# Patient Record
Sex: FEMALE | Race: BLACK OR AFRICAN AMERICAN | NOT HISPANIC OR LATINO | URBAN - METROPOLITAN AREA
[De-identification: names, ages, dates, MRNs, and addresses within clinical notes are randomized per-mention and may not be internally consistent; named-entity substitution may affect disease eponyms.]

---

## 2024-03-22 ENCOUNTER — INPATIENT (INPATIENT)
Facility: HOSPITAL | Age: 34
LOS: 2 days | Discharge: HOME | End: 2024-03-25
Attending: OBSTETRICS & GYNECOLOGY | Admitting: OBSTETRICS & GYNECOLOGY
Payer: COMMERCIAL

## 2024-03-22 VITALS
HEIGHT: 65 IN | DIASTOLIC BLOOD PRESSURE: 78 MMHG | WEIGHT: 207.01 LBS | SYSTOLIC BLOOD PRESSURE: 122 MMHG | RESPIRATION RATE: 18 BRPM | HEART RATE: 83 BPM | TEMPERATURE: 98 F

## 2024-03-22 DIAGNOSIS — Z98.891 HISTORY OF UTERINE SCAR FROM PREVIOUS SURGERY: Chronic | ICD-10-CM

## 2024-03-22 DIAGNOSIS — Z28.09 IMMUNIZATION NOT CARRIED OUT BECAUSE OF OTHER CONTRAINDICATION: ICD-10-CM

## 2024-03-22 DIAGNOSIS — Z28.21 IMMUNIZATION NOT CARRIED OUT BECAUSE OF PATIENT REFUSAL: ICD-10-CM

## 2024-03-22 LAB
AMPHET UR-MCNC: NEGATIVE — SIGNIFICANT CHANGE UP
APPEARANCE UR: ABNORMAL
BACTERIA # UR AUTO: ABNORMAL /HPF
BARBITURATES UR SCN-MCNC: NEGATIVE — SIGNIFICANT CHANGE UP
BASOPHILS # BLD AUTO: 0.02 K/UL — SIGNIFICANT CHANGE UP (ref 0–0.2)
BASOPHILS NFR BLD AUTO: 0.3 % — SIGNIFICANT CHANGE UP (ref 0–1)
BENZODIAZ UR-MCNC: NEGATIVE — SIGNIFICANT CHANGE UP
BILIRUB UR-MCNC: NEGATIVE — SIGNIFICANT CHANGE UP
BLD GP AB SCN SERPL QL: SIGNIFICANT CHANGE UP
BUPRENORPHINE SCREEN, URINE RESULT: NEGATIVE — SIGNIFICANT CHANGE UP
CAST: 2 /LPF — SIGNIFICANT CHANGE UP (ref 0–4)
COCAINE METAB.OTHER UR-MCNC: NEGATIVE — SIGNIFICANT CHANGE UP
COLOR SPEC: YELLOW — SIGNIFICANT CHANGE UP
DIFF PNL FLD: NEGATIVE — SIGNIFICANT CHANGE UP
EOSINOPHIL # BLD AUTO: 0.02 K/UL — SIGNIFICANT CHANGE UP (ref 0–0.7)
EOSINOPHIL NFR BLD AUTO: 0.3 % — SIGNIFICANT CHANGE UP (ref 0–8)
FENTANYL UR QL: NEGATIVE — SIGNIFICANT CHANGE UP
GLUCOSE UR QL: NEGATIVE MG/DL — SIGNIFICANT CHANGE UP
HCT VFR BLD CALC: 37.9 % — SIGNIFICANT CHANGE UP (ref 37–47)
HGB BLD-MCNC: 12.1 G/DL — SIGNIFICANT CHANGE UP (ref 12–16)
HIV 1 & 2 AB SERPL IA.RAPID: SIGNIFICANT CHANGE UP
IMM GRANULOCYTES NFR BLD AUTO: 1.8 % — HIGH (ref 0.1–0.3)
KETONES UR-MCNC: NEGATIVE MG/DL — SIGNIFICANT CHANGE UP
L&D DRUG SCREEN, URINE: SIGNIFICANT CHANGE UP
LEUKOCYTE ESTERASE UR-ACNC: ABNORMAL
LYMPHOCYTES # BLD AUTO: 0.91 K/UL — LOW (ref 1.2–3.4)
LYMPHOCYTES # BLD AUTO: 12.5 % — LOW (ref 20.5–51.1)
MCHC RBC-ENTMCNC: 26.7 PG — LOW (ref 27–31)
MCHC RBC-ENTMCNC: 31.9 G/DL — LOW (ref 32–37)
MCV RBC AUTO: 83.5 FL — SIGNIFICANT CHANGE UP (ref 81–99)
METHADONE UR-MCNC: NEGATIVE — SIGNIFICANT CHANGE UP
MONOCYTES # BLD AUTO: 0.43 K/UL — SIGNIFICANT CHANGE UP (ref 0.1–0.6)
MONOCYTES NFR BLD AUTO: 5.9 % — SIGNIFICANT CHANGE UP (ref 1.7–9.3)
NEUTROPHILS # BLD AUTO: 5.77 K/UL — SIGNIFICANT CHANGE UP (ref 1.4–6.5)
NEUTROPHILS NFR BLD AUTO: 79.2 % — HIGH (ref 42.2–75.2)
NITRITE UR-MCNC: NEGATIVE — SIGNIFICANT CHANGE UP
NRBC # BLD: 0 /100 WBCS — SIGNIFICANT CHANGE UP (ref 0–0)
OPIATES UR-MCNC: NEGATIVE — SIGNIFICANT CHANGE UP
OXYCODONE UR-MCNC: NEGATIVE — SIGNIFICANT CHANGE UP
PCP UR-MCNC: NEGATIVE — SIGNIFICANT CHANGE UP
PH UR: 6.5 — SIGNIFICANT CHANGE UP (ref 5–8)
PLATELET # BLD AUTO: 225 K/UL — SIGNIFICANT CHANGE UP (ref 130–400)
PMV BLD: 9.9 FL — SIGNIFICANT CHANGE UP (ref 7.4–10.4)
PROPOXYPHENE QUALITATIVE URINE RESULT: NEGATIVE — SIGNIFICANT CHANGE UP
PROT UR-MCNC: SIGNIFICANT CHANGE UP MG/DL
RBC # BLD: 4.54 M/UL — SIGNIFICANT CHANGE UP (ref 4.2–5.4)
RBC # FLD: 14.3 % — SIGNIFICANT CHANGE UP (ref 11.5–14.5)
RBC CASTS # UR COMP ASSIST: 2 /HPF — SIGNIFICANT CHANGE UP (ref 0–4)
SP GR SPEC: 1.03 — SIGNIFICANT CHANGE UP (ref 1–1.03)
SQUAMOUS # UR AUTO: 10 /HPF — HIGH (ref 0–5)
UROBILINOGEN FLD QL: 1 MG/DL — SIGNIFICANT CHANGE UP (ref 0.2–1)
WBC # BLD: 7.28 K/UL — SIGNIFICANT CHANGE UP (ref 4.8–10.8)
WBC # FLD AUTO: 7.28 K/UL — SIGNIFICANT CHANGE UP (ref 4.8–10.8)
WBC UR QL: 11 /HPF — HIGH (ref 0–5)

## 2024-03-22 PROCEDURE — 80307 DRUG TEST PRSMV CHEM ANLYZR: CPT

## 2024-03-22 PROCEDURE — 86703 HIV-1/HIV-2 1 RESULT ANTBDY: CPT

## 2024-03-22 PROCEDURE — 80354 DRUG SCREENING FENTANYL: CPT

## 2024-03-22 PROCEDURE — 59025 FETAL NON-STRESS TEST: CPT

## 2024-03-22 PROCEDURE — 86780 TREPONEMA PALLIDUM: CPT

## 2024-03-22 PROCEDURE — 86901 BLOOD TYPING SEROLOGIC RH(D): CPT

## 2024-03-22 PROCEDURE — 81001 URINALYSIS AUTO W/SCOPE: CPT

## 2024-03-22 PROCEDURE — 85025 COMPLETE CBC W/AUTO DIFF WBC: CPT

## 2024-03-22 PROCEDURE — 86850 RBC ANTIBODY SCREEN: CPT

## 2024-03-22 PROCEDURE — 36415 COLL VENOUS BLD VENIPUNCTURE: CPT

## 2024-03-22 PROCEDURE — 86900 BLOOD TYPING SEROLOGIC ABO: CPT

## 2024-03-22 RX ORDER — SODIUM CHLORIDE 9 MG/ML
1000 INJECTION, SOLUTION INTRAVENOUS ONCE
Refills: 0 | Status: DISCONTINUED | OUTPATIENT
Start: 2024-03-22 | End: 2024-03-22

## 2024-03-22 RX ORDER — OXYTOCIN 10 UNIT/ML
333.33 VIAL (ML) INJECTION
Qty: 20 | Refills: 0 | Status: DISCONTINUED | OUTPATIENT
Start: 2024-03-22 | End: 2024-03-25

## 2024-03-22 RX ORDER — ENOXAPARIN SODIUM 100 MG/ML
40 INJECTION SUBCUTANEOUS EVERY 24 HOURS
Refills: 0 | Status: DISCONTINUED | OUTPATIENT
Start: 2024-03-22 | End: 2024-03-25

## 2024-03-22 RX ORDER — MAGNESIUM HYDROXIDE 400 MG/1
30 TABLET, CHEWABLE ORAL
Refills: 0 | Status: DISCONTINUED | OUTPATIENT
Start: 2024-03-22 | End: 2024-03-25

## 2024-03-22 RX ORDER — ONDANSETRON 8 MG/1
4 TABLET, FILM COATED ORAL EVERY 6 HOURS
Refills: 0 | Status: DISCONTINUED | OUTPATIENT
Start: 2024-03-22 | End: 2024-03-22

## 2024-03-22 RX ORDER — OXYCODONE HYDROCHLORIDE 5 MG/1
5 TABLET ORAL ONCE
Refills: 0 | Status: DISCONTINUED | OUTPATIENT
Start: 2024-03-22 | End: 2024-03-25

## 2024-03-22 RX ORDER — ACETAMINOPHEN 500 MG
975 TABLET ORAL
Refills: 0 | Status: DISCONTINUED | OUTPATIENT
Start: 2024-03-22 | End: 2024-03-25

## 2024-03-22 RX ORDER — OXYTOCIN 10 UNIT/ML
333.33 VIAL (ML) INJECTION
Qty: 20 | Refills: 0 | Status: DISCONTINUED | OUTPATIENT
Start: 2024-03-22 | End: 2024-03-22

## 2024-03-22 RX ORDER — IBUPROFEN 200 MG
600 TABLET ORAL EVERY 6 HOURS
Refills: 0 | Status: COMPLETED | OUTPATIENT
Start: 2024-03-22 | End: 2025-02-18

## 2024-03-22 RX ORDER — SIMETHICONE 80 MG/1
80 TABLET, CHEWABLE ORAL EVERY 4 HOURS
Refills: 0 | Status: DISCONTINUED | OUTPATIENT
Start: 2024-03-22 | End: 2024-03-25

## 2024-03-22 RX ORDER — LANOLIN
1 OINTMENT (GRAM) TOPICAL EVERY 6 HOURS
Refills: 0 | Status: DISCONTINUED | OUTPATIENT
Start: 2024-03-22 | End: 2024-03-25

## 2024-03-22 RX ORDER — SODIUM CHLORIDE 9 MG/ML
1000 INJECTION, SOLUTION INTRAVENOUS
Refills: 0 | Status: DISCONTINUED | OUTPATIENT
Start: 2024-03-22 | End: 2024-03-22

## 2024-03-22 RX ORDER — CEFAZOLIN SODIUM 1 G
2000 VIAL (EA) INJECTION ONCE
Refills: 0 | Status: COMPLETED | OUTPATIENT
Start: 2024-03-22 | End: 2024-03-22

## 2024-03-22 RX ORDER — DIPHENHYDRAMINE HCL 50 MG
25 CAPSULE ORAL EVERY 6 HOURS
Refills: 0 | Status: DISCONTINUED | OUTPATIENT
Start: 2024-03-22 | End: 2024-03-25

## 2024-03-22 RX ORDER — SODIUM CHLORIDE 9 MG/ML
1000 INJECTION, SOLUTION INTRAVENOUS
Refills: 0 | Status: DISCONTINUED | OUTPATIENT
Start: 2024-03-22 | End: 2024-03-25

## 2024-03-22 RX ORDER — LEVOTHYROXINE SODIUM 125 MCG
50 TABLET ORAL DAILY
Refills: 0 | Status: DISCONTINUED | OUTPATIENT
Start: 2024-03-22 | End: 2024-03-25

## 2024-03-22 RX ORDER — OXYCODONE HYDROCHLORIDE 5 MG/1
5 TABLET ORAL
Refills: 0 | Status: DISCONTINUED | OUTPATIENT
Start: 2024-03-22 | End: 2024-03-25

## 2024-03-22 RX ORDER — NALOXONE HYDROCHLORIDE 4 MG/.1ML
0.1 SPRAY NASAL
Refills: 0 | Status: DISCONTINUED | OUTPATIENT
Start: 2024-03-22 | End: 2024-03-22

## 2024-03-22 RX ORDER — KETOROLAC TROMETHAMINE 30 MG/ML
30 SYRINGE (ML) INJECTION EVERY 6 HOURS
Refills: 0 | Status: DISCONTINUED | OUTPATIENT
Start: 2024-03-22 | End: 2024-03-23

## 2024-03-22 RX ORDER — MORPHINE SULFATE 50 MG/1
0.15 CAPSULE, EXTENDED RELEASE ORAL ONCE
Refills: 0 | Status: DISCONTINUED | OUTPATIENT
Start: 2024-03-22 | End: 2024-03-22

## 2024-03-22 RX ORDER — DEXAMETHASONE 0.5 MG/5ML
4 ELIXIR ORAL EVERY 6 HOURS
Refills: 0 | Status: DISCONTINUED | OUTPATIENT
Start: 2024-03-22 | End: 2024-03-22

## 2024-03-22 RX ORDER — TETANUS TOXOID, REDUCED DIPHTHERIA TOXOID AND ACELLULAR PERTUSSIS VACCINE, ADSORBED 5; 2.5; 8; 8; 2.5 [IU]/.5ML; [IU]/.5ML; UG/.5ML; UG/.5ML; UG/.5ML
0.5 SUSPENSION INTRAMUSCULAR ONCE
Refills: 0 | Status: DISCONTINUED | OUTPATIENT
Start: 2024-03-22 | End: 2024-03-25

## 2024-03-22 RX ADMIN — Medication 30 MILLIGRAM(S): at 14:18

## 2024-03-22 RX ADMIN — Medication 30 MILLIGRAM(S): at 22:27

## 2024-03-22 RX ADMIN — Medication 30 MILLIGRAM(S): at 21:57

## 2024-03-22 RX ADMIN — ENOXAPARIN SODIUM 40 MILLIGRAM(S): 100 INJECTION SUBCUTANEOUS at 21:57

## 2024-03-22 RX ADMIN — Medication 975 MILLIGRAM(S): at 17:15

## 2024-03-22 RX ADMIN — Medication 30 MILLIGRAM(S): at 14:45

## 2024-03-22 RX ADMIN — Medication 975 MILLIGRAM(S): at 17:45

## 2024-03-22 RX ADMIN — Medication 100 MILLIGRAM(S): at 08:47

## 2024-03-22 NOTE — OB PROVIDER H&P - NSICDXPASTSURGICALHX_GEN_ALL_CORE_FT
PAST SURGICAL HISTORY:  No significant past surgical history PAST SURGICAL HISTORY:  Previous  section

## 2024-03-22 NOTE — OB PROVIDER DELIVERY SUMMARY - NS_GESTAGEATBIRTHA_OBGYN_ALL_OB_FT
41w6d Protopic Pregnancy And Lactation Text: This medication is Pregnancy Category C. It is unknown if this medication is excreted in breast milk when applied topically.

## 2024-03-22 NOTE — OB PROVIDER DELIVERY SUMMARY - NSCSDELIVASCHE_OBGYN_ALL_OB
Pt here for iron infusion.   Arrives Ambulating independently, accompanied by Other self           Modifications in dose or schedule: No     Frequency of blood return and site check throughout administration: Prior to administration   Discharged to Home, Am Scheduled

## 2024-03-22 NOTE — OB RN INTRAOPERATIVE NOTE - NSSELHIDDEN_OBGYN_ALL_OB_FT
[NS_DeliveryAttending1_OBGYN_ALL_OB_FT:[id number]],[NS_DeliveryAssist1_OBGYN_ALL_OB_FT:[id number]=],[NS_DeliveryRN_OBGYN_ALL_OB_FT:[id number]=]

## 2024-03-22 NOTE — OB PROVIDER H&P - NSHPLABSRESULTS_GEN_ALL_CORE
12/15 GCT 87  10/2 AFP neg, NIPT neg. AFP neg  8/15 PN Labs: A POS  RPR nr  Measles, rubella, VZV immune  HBSag neg  HIV neg    2/16/24  GBS neg    22w3d vtx, posterior placenta, MVP 5.1 cm, incomplete views of LVOT, 3VV  32w4d  breech, posterior, 14.7 cm ROHITH, BPP 8/8, EFW 1884 gm 37%  36w6d vtx, posterior, 14.4 cm ROHITH, BPP 8/8, nl UAD EFW 2721 gms 30%

## 2024-03-22 NOTE — BRIEF OPERATIVE NOTE - OPERATION/FINDINGS
Pfannenstiel skin incision. Low transverse uterine incision. Clear amniotic fluid. Female  in cephalic position, APGARs 9/9. Hysterotomy closed in two layers. Normal tubes and ovaries bilaterally. 5cm posterior submucosal fibroid.

## 2024-03-22 NOTE — OB PROVIDER H&P - NSHPPHYSICALEXAM_GEN_ALL_CORE
Gen: Nad  Abd:  VE:deferred  FHR:  TOCO: Vital Signs (24 Hrs):  T(C): 36.5 (03-22-24 @ 06:55), Max: 36.5 (03-22-24 @ 06:55)  HR: 83 (03-22-24 @ 07:15) (83 - 83)  BP: 122/78 (03-22-24 @ 07:15) (122/78 - 122/78)  RR: 18 (03-22-24 @ 07:15) (18 - 18)    Gen: Nad  Abd: gravid, soft NT  VE:deferred  FHR:140/mod/+accels  TOCO: none

## 2024-03-22 NOTE — PROGRESS NOTE ADULT - ASSESSMENT
A/P: 33yo now P2 S/P repeat C/S #2 POD 0, QBL 3580cc, recovering well   - pain management with PO pain meds   - monitor vitals/bleeding   - encourage incentive spirometry   - ambulation/PO hydration  - advance diet as tolerated   - simethicone  - synthroid 50mcg ordered  - SCDs/lovenox for DVT prophylaxis   - UO adequate, goldstein until AM  - f/u AM labs   - routine postop care    A/P: 35yo now P2 S/P repeat C/S #2 POD 0, QBL 350cc, recovering well   - pain management with PO pain meds   - monitor vitals/bleeding   - encourage incentive spirometry   - ambulation/PO hydration  - advance diet as tolerated   - simethicone  - synthroid 50mcg ordered  - SCDs/lovenox for DVT prophylaxis   - UO adequate, goldstein until AM  - f/u AM labs   - routine postop care

## 2024-03-22 NOTE — OB PROVIDER H&P - NS_OBGYNHISTORY_OBGYN_ALL_OB_FT
primary c/s #1- 12/9/2006 NRFHR   Denies abn paps, fibroids, cysts or STIs primary c/s #1-6 lbs 9 oz 12/9/2006 NRFHR   Denies abn paps, fibroids, cysts or STIs

## 2024-03-22 NOTE — OB RN DELIVERY SUMMARY - NSSELHIDDEN_OBGYN_ALL_OB_FT
[NS_DeliveryAttending1_OBGYN_ALL_OB_FT:[id number]=],[NS_DeliveryAssist1_OBGYN_ALL_OB_FT:[id number]=],[NS_DeliveryRN_OBGYN_ALL_OB_FT:[id number]=]

## 2024-03-22 NOTE — CHART NOTE - NSCHARTNOTEFT_GEN_A_CORE
PACU ANESTHESIA ADMISSION NOTE      Procedure: Repeat  section      Post op diagnosis:  Post term pregnancy, 41 weeks        ____  Intubated  TV:______       Rate: ______      FiO2: ______    _x___  Patent Airway    _x___  Full return of protective reflexes    _x___  Full recovery from anesthesia / back to baseline status    Vitals:  T(C): 36.4  HR: 66  BP: 102/58  RR: 18  SpO2: 96%    Mental Status:  _x___ Awake   _____ Alert   _____ Drowsy   _____ Sedated    Nausea/Vomiting:  _x___  NO       ______Yes,   See Post - Op Orders         Pain Scale (0-10):  __0___    Treatment: _x___ None    ____ See Post - Op/PCA Orders    Post - Operative Fluids:   __x__ Oral   ____ See Post - Op Orders    Plan: Discharge:   ____Home       _x____Floor     _____Critical Care    _____  Other:_________________    Comments:  No anesthesia issues or complications noted.  Discharge when criteria met.

## 2024-03-22 NOTE — OB PROVIDER H&P - ASSESSMENT
34 yr old  at 40w6d, AMA, RH POS/GBS neg for repeat c/s#2.    Admit to L&D  IV Hydration and labs  Continuous TOCO and EFM  Medications as ordered  Pain management prn  abx as ordered  Cross for 2 units    Dr Gonzalez aware 34 yr old  at 40w6d, AMA, RH POS/GBS, hypothryoid on synthroid neg for repeat c/s#2.    Admit to L&D  IV Hydration and labs  Continuous TOCO and EFM  Medications as ordered  Pain management prn  abx as ordered  Cross for 2 units  continue synthroid    Dr Gonzalez aware

## 2024-03-22 NOTE — OB PROVIDER H&P - HISTORY OF PRESENT ILLNESS
34 yr old  at 40w6d presents for c/s #2.  Pt had primary for NRFHR in , was waiting for natural labor to occur before a repeat c/s.  Pt reports (+) FM, denies VB, lof, ctx.   34 yr old  at 40w6d presents for c/s #2.  Pt had primary for NRFHR in , was waiting for natural labor to occur before a repeat c/s.  Pt reports (+) FM, with irregular ctx,  denies VB, lof.  Pt is hypothryoid on synthroid 50 mcg, same pre-pregnancy.

## 2024-03-22 NOTE — OB PROVIDER DELIVERY SUMMARY - NSSELHIDDEN_OBGYN_ALL_OB_FT
[NS_DeliveryAttending1_OBGYN_ALL_OB_FT:[id number]=],[NS_DeliveryAssist1_OBGYN_ALL_OB_FT:[id number]=],[NS_DeliveryRN_OBGYN_ALL_OB_FT:[id number]=]

## 2024-03-23 LAB
BASOPHILS # BLD AUTO: 0.02 K/UL — SIGNIFICANT CHANGE UP (ref 0–0.2)
BASOPHILS NFR BLD AUTO: 0.3 % — SIGNIFICANT CHANGE UP (ref 0–1)
EOSINOPHIL # BLD AUTO: 0.02 K/UL — SIGNIFICANT CHANGE UP (ref 0–0.7)
EOSINOPHIL NFR BLD AUTO: 0.3 % — SIGNIFICANT CHANGE UP (ref 0–8)
HCT VFR BLD CALC: 32.2 % — LOW (ref 37–47)
HGB BLD-MCNC: 10.4 G/DL — LOW (ref 12–16)
IMM GRANULOCYTES NFR BLD AUTO: 0.7 % — HIGH (ref 0.1–0.3)
LYMPHOCYTES # BLD AUTO: 0.36 K/UL — LOW (ref 1.2–3.4)
LYMPHOCYTES # BLD AUTO: 5.1 % — LOW (ref 20.5–51.1)
MCHC RBC-ENTMCNC: 26.9 PG — LOW (ref 27–31)
MCHC RBC-ENTMCNC: 32.3 G/DL — SIGNIFICANT CHANGE UP (ref 32–37)
MCV RBC AUTO: 83.2 FL — SIGNIFICANT CHANGE UP (ref 81–99)
MONOCYTES # BLD AUTO: 0.16 K/UL — SIGNIFICANT CHANGE UP (ref 0.1–0.6)
MONOCYTES NFR BLD AUTO: 2.3 % — SIGNIFICANT CHANGE UP (ref 1.7–9.3)
NEUTROPHILS # BLD AUTO: 6.39 K/UL — SIGNIFICANT CHANGE UP (ref 1.4–6.5)
NEUTROPHILS NFR BLD AUTO: 91.3 % — HIGH (ref 42.2–75.2)
NRBC # BLD: 0 /100 WBCS — SIGNIFICANT CHANGE UP (ref 0–0)
PLATELET # BLD AUTO: 183 K/UL — SIGNIFICANT CHANGE UP (ref 130–400)
PMV BLD: 10.3 FL — SIGNIFICANT CHANGE UP (ref 7.4–10.4)
RBC # BLD: 3.87 M/UL — LOW (ref 4.2–5.4)
RBC # FLD: 14.5 % — SIGNIFICANT CHANGE UP (ref 11.5–14.5)
T PALLIDUM AB TITR SER: NEGATIVE — SIGNIFICANT CHANGE UP
WBC # BLD: 7 K/UL — SIGNIFICANT CHANGE UP (ref 4.8–10.8)
WBC # FLD AUTO: 7 K/UL — SIGNIFICANT CHANGE UP (ref 4.8–10.8)

## 2024-03-23 RX ORDER — IBUPROFEN 200 MG
600 TABLET ORAL EVERY 6 HOURS
Refills: 0 | Status: DISCONTINUED | OUTPATIENT
Start: 2024-03-23 | End: 2024-03-25

## 2024-03-23 RX ADMIN — Medication 975 MILLIGRAM(S): at 13:48

## 2024-03-23 RX ADMIN — Medication 600 MILLIGRAM(S): at 08:45

## 2024-03-23 RX ADMIN — Medication 600 MILLIGRAM(S): at 22:21

## 2024-03-23 RX ADMIN — Medication 975 MILLIGRAM(S): at 17:40

## 2024-03-23 RX ADMIN — Medication 1 TABLET(S): at 13:21

## 2024-03-23 RX ADMIN — Medication 975 MILLIGRAM(S): at 12:25

## 2024-03-23 RX ADMIN — Medication 600 MILLIGRAM(S): at 09:30

## 2024-03-23 RX ADMIN — Medication 50 MICROGRAM(S): at 05:24

## 2024-03-23 RX ADMIN — Medication 975 MILLIGRAM(S): at 06:41

## 2024-03-23 RX ADMIN — ENOXAPARIN SODIUM 40 MILLIGRAM(S): 100 INJECTION SUBCUTANEOUS at 22:20

## 2024-03-23 RX ADMIN — Medication 975 MILLIGRAM(S): at 00:50

## 2024-03-23 RX ADMIN — Medication 600 MILLIGRAM(S): at 16:43

## 2024-03-23 RX ADMIN — Medication 975 MILLIGRAM(S): at 18:35

## 2024-03-23 RX ADMIN — Medication 30 MILLIGRAM(S): at 03:28

## 2024-03-23 RX ADMIN — Medication 30 MILLIGRAM(S): at 03:58

## 2024-03-23 RX ADMIN — Medication 975 MILLIGRAM(S): at 00:20

## 2024-03-23 RX ADMIN — Medication 600 MILLIGRAM(S): at 23:00

## 2024-03-23 RX ADMIN — Medication 975 MILLIGRAM(S): at 07:11

## 2024-03-23 RX ADMIN — Medication 600 MILLIGRAM(S): at 15:35

## 2024-03-23 NOTE — PROGRESS NOTE ADULT - ASSESSMENT
A/P: 35yo now P2 S/P repeat C/S #2 POD 1, QBL 350cc, recovering well   - pain management with PO pain meds   - monitor vitals/bleeding   - encourage incentive spirometry   - ambulation/PO hydration  - advance diet as tolerated   - simethicone  - synthroid 50mcg ordered  - SCDs/lovenox for DVT prophylaxis   -TOV 1130  - f/u AM labs   - routine postop care

## 2024-03-24 RX ORDER — SIMETHICONE 80 MG/1
1 TABLET, CHEWABLE ORAL
Qty: 0 | Refills: 0 | DISCHARGE
Start: 2024-03-24

## 2024-03-24 RX ORDER — ACETAMINOPHEN 500 MG
3 TABLET ORAL
Qty: 120 | Refills: 0
Start: 2024-03-24 | End: 2024-04-02

## 2024-03-24 RX ORDER — IBUPROFEN 200 MG
1 TABLET ORAL
Qty: 40 | Refills: 0
Start: 2024-03-24 | End: 2024-04-02

## 2024-03-24 RX ORDER — LEVOTHYROXINE SODIUM 125 MCG
1 TABLET ORAL
Qty: 0 | Refills: 0 | DISCHARGE
Start: 2024-03-24

## 2024-03-24 RX ADMIN — Medication 975 MILLIGRAM(S): at 11:45

## 2024-03-24 RX ADMIN — Medication 975 MILLIGRAM(S): at 00:39

## 2024-03-24 RX ADMIN — Medication 600 MILLIGRAM(S): at 09:45

## 2024-03-24 RX ADMIN — Medication 600 MILLIGRAM(S): at 02:55

## 2024-03-24 RX ADMIN — Medication 600 MILLIGRAM(S): at 03:57

## 2024-03-24 RX ADMIN — Medication 600 MILLIGRAM(S): at 15:25

## 2024-03-24 RX ADMIN — Medication 600 MILLIGRAM(S): at 15:00

## 2024-03-24 RX ADMIN — Medication 50 MICROGRAM(S): at 05:59

## 2024-03-24 RX ADMIN — Medication 975 MILLIGRAM(S): at 05:59

## 2024-03-24 RX ADMIN — Medication 600 MILLIGRAM(S): at 22:14

## 2024-03-24 RX ADMIN — Medication 975 MILLIGRAM(S): at 01:10

## 2024-03-24 RX ADMIN — Medication 975 MILLIGRAM(S): at 18:04

## 2024-03-24 RX ADMIN — ENOXAPARIN SODIUM 40 MILLIGRAM(S): 100 INJECTION SUBCUTANEOUS at 22:14

## 2024-03-24 RX ADMIN — Medication 600 MILLIGRAM(S): at 09:50

## 2024-03-24 RX ADMIN — Medication 1 TABLET(S): at 11:37

## 2024-03-24 RX ADMIN — Medication 975 MILLIGRAM(S): at 11:37

## 2024-03-24 NOTE — DISCHARGE NOTE OB - NS MD DC FALL RISK RISK
For information on Fall & Injury Prevention, visit: [email or web address] OR  [email or web address] OR  https://www.cdc.gov/steadi/patient.html
normal

## 2024-03-24 NOTE — DISCHARGE NOTE OB - MEDICATION SUMMARY - MEDICATIONS TO TAKE
I will START or STAY ON the medications listed below when I get home from the hospital:    ibuprofen 600 mg oral tablet  -- 1 tab(s) by mouth every 6 hours  -- Indication: For Pain    acetaminophen 325 mg oral tablet  -- 3 tab(s) by mouth every 6 hours  -- Indication: For Pain    Prenatal Multivitamins with Folic Acid 1 mg oral tablet  -- 1 tab(s) by mouth once a day  -- Indication: For vitamin    simethicone 80 mg oral tablet, chewable  -- 1 tab(s) by mouth every 4 hours As needed Gas  -- Indication: For gas pain    levothyroxine 50 mcg (0.05 mg) oral tablet  -- 1 tab(s) by mouth once a day  -- Indication: For Hypothyroid

## 2024-03-24 NOTE — DISCHARGE NOTE OB - CARE PROVIDER_API CALL
Damir Gonzalez  Obstetrics and Gynecology  [address] [place] [id number]  Phone: [phone]  Fax: [phone]  Follow Up Time: 1 week

## 2024-03-24 NOTE — DISCHARGE NOTE OB - HOSPITAL COURSE
Patient underwent an uncomplicated repeat LTCS #2. QBL 350cc, H/H as below. Patient’s postoperative course was unremarkable and she remained hemodynamically stable and afebrile throughout. Upon discharge on POD3, the patient is ambulating and voiding spontaneously, tolerating oral intake, pain was well controlled with oral medication, and vital signs were stable.

## 2024-03-24 NOTE — DISCHARGE NOTE OB - ADDITIONAL INSTRUCTIONS
No heavy lifting.   Nothing inside the vagina for 6 weeks: no tampons, douching, sexual intercourse, tub baths or pools.   If you have a fever over 100.4F, severe abdominal pain or heavy vaginal bleeding please call your doctor or go to the emergency room.  Please follow up with your OBGYN in 1 week for an incision and 6 weeks for a postpartum visit.

## 2024-03-24 NOTE — DISCHARGE NOTE OB - PATIENT PORTAL LINK FT
You can access the FollowMyHealth Patient Portal offered by [facility] by registering at the following website: [email or web address]. By joining [facility]’s FollowMyHealth portal, you will also be able to view your health information using other applications (apps) compatible with our system.

## 2024-03-24 NOTE — PROGRESS NOTE ADULT - ASSESSMENT
33yo now P2 S/P repeat C/S #2 POD2, QBL 350cc, hypothyroidism, recovering well.    - encourage ambulation, PO hydration  - f/u AM CBC   - monitor vitals, bleeding   - regular diet  - encourage incentive spirometry   - pain mgmt prn   - synthroid 50mcg  - simethicone  - routine postop care   - lovenox   - anticipate d/c home tomorrow    Discussed with Dr. Treadwell.  33yo now P2 S/P repeat C/S #2 POD2, QBL 350cc, hypothyroidism, recovering well.    - encourage ambulation, PO hydration  - CBC stable  - monitor vitals, bleeding   - regular diet  - encourage incentive spirometry   - pain mgmt prn   - synthroid 50mcg  - simethicone  - routine postop care   - lovenox   - anticipate d/c home tomorrow    Discussed with Dr. Treadwell.

## 2024-03-25 VITALS
HEART RATE: 69 BPM | SYSTOLIC BLOOD PRESSURE: 129 MMHG | RESPIRATION RATE: 18 BRPM | DIASTOLIC BLOOD PRESSURE: 82 MMHG | TEMPERATURE: 99 F

## 2024-03-25 RX ADMIN — Medication 600 MILLIGRAM(S): at 09:00

## 2024-03-25 RX ADMIN — SIMETHICONE 80 MILLIGRAM(S): 80 TABLET, CHEWABLE ORAL at 11:41

## 2024-03-25 RX ADMIN — Medication 50 MICROGRAM(S): at 06:25

## 2024-03-25 RX ADMIN — Medication 1 TABLET(S): at 11:42

## 2024-03-25 RX ADMIN — Medication 975 MILLIGRAM(S): at 06:25

## 2024-03-25 RX ADMIN — Medication 975 MILLIGRAM(S): at 11:41

## 2024-03-25 RX ADMIN — Medication 975 MILLIGRAM(S): at 00:09

## 2024-03-25 NOTE — PROGRESS NOTE ADULT - ATTENDING COMMENTS
Pt seen and evaluated at bedside. Pain well controlled. Denies dizziness/lightheadedness/CP/SOB/palpitations. Denies fever, chills, nausea/vomiting, diarrhea, dysuria, LE pain. Tolerating regular diet. +flatus. Ambulating out of bed independently    Physical Exam  Gen: AAOx3, NAD  Heart: RRR, S1S2+  Lungs: CTA B/L, no r/r/w   Fundus: firm, below umbilicus   Wound: steris in place c/d/i   Abd: Soft, nontender, nondistended, BS+  Lochia: minimal   Ext: No calf tenderness, no swelling      A/P:  35yo  POD#3 s/p repeat LTCS, recovering well   - asymptomatic acute blood loss anemia, POD#1 H/H 10.4/32.2 appropriate given EBL 350mL  - pain management with PO pain meds   - simethicone/dulcolax   - SCDs/lovenox for DVT prophylaxis   - routine postop care

## 2024-03-25 NOTE — PROGRESS NOTE ADULT - ASSESSMENT
35yo now P2 S/P repeat C/S #2 POD3, QBL 350cc, hypothyroidism, recovering well.    - encourage ambulation, PO hydration  - CBC stable  - monitor vitals, bleeding   - regular diet  - encourage incentive spirometry   - pain mgmt prn   - synthroid 50mcg  - simethicone  - routine postop care   - lovenox   - anticipate d/c home today    Discussed with Dr. Treadwell.

## 2024-03-25 NOTE — PROGRESS NOTE ADULT - SUBJECTIVE AND OBJECTIVE BOX
PGY 1 Note:    Pt seen and evaluated at bedside. Pt doing well, pain well controlled. No overnight events, no acute complaints.  Denies dizziness/lightheadedness, fever, chills, nausea/vomiting, dysuria, LE pain, or heavy vaginal bleeding. Patient is ambulating or passing flatus. Tolerating regular diet. Santacruz removed at 0530, not yet voided. Breastfeeding    Physical Exam  Vital Signs Last 24 Hrs  T(C): 36.8 (03-23-24 @ 02:58), Max: 36.9 (03-22-24 @ 13:03)  T(F): 98.3 (03-23-24 @ 02:58), Max: 98.5 (03-22-24 @ 13:03)  HR: 67 (03-23-24 @ 02:58) (55 - 83)  BP: 120/71 (03-23-24 @ 02:58) (97/56 - 122/78)  RR: 18 (03-23-24 @ 02:58) (18 - 18)  SpO2: 98% (03-22-24 @ 12:26) (93% - 98%)      Gen: AAOx3, NAD  Heart: RRR, S1S2+  Lungs: CTA B/L, no r/r/w   Fundus: firm, below umbilicus   Wound: Pfannenstiel incision, dressing changed, steris c/d/i. No surrounding erythema or edema.   Abd: Soft, nontender, nondistended, BS+  Lochia: minimal   Ext: No calf tenderness, no swelling    Labs:                        12.1   7.28  )-----------( 225      ( 22 Mar 2024 07:20 )             37.9        MEDICATIONS  (STANDING):  acetaminophen     Tablet .. 975 milliGRAM(s) Oral <User Schedule>  diphtheria/tetanus/pertussis (acellular) Vaccine (Adacel) 0.5 milliLiter(s) IntraMuscular once  enoxaparin Injectable 40 milliGRAM(s) SubCutaneous every 24 hours  ibuprofen  Tablet. 600 milliGRAM(s) Oral every 6 hours  ketorolac   Injectable 30 milliGRAM(s) IV Push every 6 hours  lactated ringers. 1000 milliLiter(s) (125 mL/Hr) IV Continuous <Continuous>  oxytocin Infusion 333.333 milliUNIT(s)/Min (1000 mL/Hr) IV Continuous <Continuous>    MEDICATIONS  (PRN):  diphenhydrAMINE 25 milliGRAM(s) Oral every 6 hours PRN Pruritus  lanolin Ointment 1 Application(s) Topical every 6 hours PRN Sore Nipples  magnesium hydroxide Suspension 30 milliLiter(s) Oral two times a day PRN Constipation  oxyCODONE    IR 5 milliGRAM(s) Oral every 3 hours PRN Moderate to Severe Pain (4-10)  oxyCODONE    IR 5 milliGRAM(s) Oral once PRN Moderate to Severe Pain (4-10)  simethicone 80 milliGRAM(s) Chew every 4 hours PRN Gas      
 PGY1 Note:  Patient seen and examined at bedside, recovering well, no acute complaints. Denies fever, chills, chest pain, SOB, nausea, vomiting, LE pain. Pain well-controlled with medication. Minimal bleeding, voiding, ambulating, passed flatus, tolerating regular diet. Breast feeding.     Physical Exam  Vital Signs Last 24 Hrs  T(C): 37.1 (24 Mar 2024 07:39), Max: 37.1 (23 Mar 2024 15:35)  T(F): 98.8 (24 Mar 2024 07:39), Max: 98.8 (23 Mar 2024 15:35)  HR: 86 (24 Mar 2024 07:39) (83 - 94)  BP: 132/89 (24 Mar 2024 07:39) (107/62 - 136/75)  RR: 18 (24 Mar 2024 07:39) (16 - 20)      Gen: AAOx3, NAD  Heart: Normal S1S2, RRR, no m/r/g  Lungs: CTA b/l, no r/r/w   Fundus: firm, below umbilicus   Wound: Pfannenstiel incision, steris in place c/d/i   Abd: Soft, nontender, nondistended, BS+  Lochia: minimal  Ext: No calf tenderness, mild non-pitting swelling    Labs:                        10.4   7.00  )-----------( 183      ( 23 Mar 2024 08:00 )             32.2                         12.1   7.28  )-----------( 225      ( 22 Mar 2024 07:20 )             37.9        acetaminophen     Tablet .. 975 milliGRAM(s) Oral <User Schedule>  diphenhydrAMINE 25 milliGRAM(s) Oral every 6 hours PRN  diphtheria/tetanus/pertussis (acellular) Vaccine (Adacel) 0.5 milliLiter(s) IntraMuscular once  enoxaparin Injectable 40 milliGRAM(s) SubCutaneous every 24 hours  ibuprofen  Tablet. 600 milliGRAM(s) Oral every 6 hours  lactated ringers. 1000 milliLiter(s) IV Continuous <Continuous>  lanolin Ointment 1 Application(s) Topical every 6 hours PRN  levothyroxine 50 MICROGram(s) Oral daily  magnesium hydroxide Suspension 30 milliLiter(s) Oral two times a day PRN  oxyCODONE    IR 5 milliGRAM(s) Oral every 3 hours PRN  oxyCODONE    IR 5 milliGRAM(s) Oral once PRN  oxytocin Infusion 333.333 milliUNIT(s)/Min IV Continuous <Continuous>  prenatal multivitamin 1 Tablet(s) Oral daily  simethicone 80 milliGRAM(s) Chew every 4 hours PRN
 PGY1 Note:  Patient seen and examined at bedside, recovering well, no acute complaints. Denies fever, chills, chest pain, SOB, nausea, vomiting, LE pain. Pain well-controlled with medication. Minimal bleeding, voiding, ambulating, passed flatus, tolerating regular diet. Breast feeding.     Physical Exam  Vital Signs Last 24 Hrs  T(C): 37.2 (03-24-24 @ 23:24), Max: 37.2 (03-24-24 @ 23:24)  T(F): 98.9 (03-24-24 @ 23:24), Max: 98.9 (03-24-24 @ 23:24)  HR: 76 (03-24-24 @ 23:24) (76 - 78)  BP: 119/61 (03-24-24 @ 23:24) (119/61 - 130/89)  RR: 20 (03-24-24 @ 23:24) (18 - 20)    Gen: AAOx3, NAD  Heart: Normal S1S2, RRR, no m/r/g  Lungs: CTA b/l, no r/r/w   Fundus: firm, below umbilicus   Wound: Pfannenstiel incision, steris in place c/d/i   Abd: Soft, nontender, nondistended, BS+  Lochia: minimal  Ext: No calf tenderness, mild non-pitting swelling    Labs:                        10.4   7.00  )-----------( 183      ( 23 Mar 2024 08:00 )             32.2                         12.1   7.28  )-----------( 225      ( 22 Mar 2024 07:20 )             37.9        acetaminophen     Tablet .. 975 milliGRAM(s) Oral <User Schedule>  diphenhydrAMINE 25 milliGRAM(s) Oral every 6 hours PRN  diphtheria/tetanus/pertussis (acellular) Vaccine (Adacel) 0.5 milliLiter(s) IntraMuscular once  enoxaparin Injectable 40 milliGRAM(s) SubCutaneous every 24 hours  ibuprofen  Tablet. 600 milliGRAM(s) Oral every 6 hours  lactated ringers. 1000 milliLiter(s) IV Continuous <Continuous>  lanolin Ointment 1 Application(s) Topical every 6 hours PRN  levothyroxine 50 MICROGram(s) Oral daily  magnesium hydroxide Suspension 30 milliLiter(s) Oral two times a day PRN  oxyCODONE    IR 5 milliGRAM(s) Oral every 3 hours PRN  oxyCODONE    IR 5 milliGRAM(s) Oral once PRN  oxytocin Infusion 333.333 milliUNIT(s)/Min IV Continuous <Continuous>  prenatal multivitamin 1 Tablet(s) Oral daily  simethicone 80 milliGRAM(s) Chew every 4 hours PRN
PGY 1 Note:    Pt seen and evaluated at bedside. Pt doing well, pain well controlled. No overnight events, no acute complaints.  Denies dizziness/lightheadedness, fever, chills, nausea/vomiting, dysuria, LE pain, or heavy vaginal bleeding. Patient is not yet OOB or passing flatus. Tolerating regular diet. Santacruz in place, draining adequate clear urine. Breastfeeding    Physical Exam  Vital Signs Last 24 Hrs  T(C): 36.9 (22 Mar 2024 13:03), Max: 36.9 (22 Mar 2024 13:03)  T(F): 98.5 (22 Mar 2024 13:03), Max: 98.5 (22 Mar 2024 13:03)  HR: 63 (22 Mar 2024 13:03) (55 - 83)  BP: 114/60 (22 Mar 2024 13:03) (97/56 - 122/78)  RR: 18 (22 Mar 2024 13:03) (18 - 18)  SpO2: 98% (22 Mar 2024 12:26) (93% - 98%)      Gen: AAOx3, NAD  Heart: RRR, S1S2+  Lungs: CTA B/L, no r/r/w   Fundus: firm, below umbilicus   Wound: Pfannenstiel incision, ABD in place c/d/i. No surrounding erythema or edema.   Abd: Soft, nontender, nondistended, BS+  Lochia: minimal   Ext: No calf tenderness, no swelling    Labs:                        12.1   7.28  )-----------( 225      ( 22 Mar 2024 07:20 )             37.9        MEDICATIONS  (STANDING):  acetaminophen     Tablet .. 975 milliGRAM(s) Oral <User Schedule>  diphtheria/tetanus/pertussis (acellular) Vaccine (Adacel) 0.5 milliLiter(s) IntraMuscular once  enoxaparin Injectable 40 milliGRAM(s) SubCutaneous every 24 hours  ibuprofen  Tablet. 600 milliGRAM(s) Oral every 6 hours  ketorolac   Injectable 30 milliGRAM(s) IV Push every 6 hours  lactated ringers. 1000 milliLiter(s) (125 mL/Hr) IV Continuous <Continuous>  oxytocin Infusion 333.333 milliUNIT(s)/Min (1000 mL/Hr) IV Continuous <Continuous>    MEDICATIONS  (PRN):  diphenhydrAMINE 25 milliGRAM(s) Oral every 6 hours PRN Pruritus  lanolin Ointment 1 Application(s) Topical every 6 hours PRN Sore Nipples  magnesium hydroxide Suspension 30 milliLiter(s) Oral two times a day PRN Constipation  oxyCODONE    IR 5 milliGRAM(s) Oral every 3 hours PRN Moderate to Severe Pain (4-10)  oxyCODONE    IR 5 milliGRAM(s) Oral once PRN Moderate to Severe Pain (4-10)  simethicone 80 milliGRAM(s) Chew every 4 hours PRN Gas

## 2024-04-02 DIAGNOSIS — D25.0 SUBMUCOUS LEIOMYOMA OF UTERUS: ICD-10-CM

## 2024-04-02 DIAGNOSIS — Z91.040 LATEX ALLERGY STATUS: ICD-10-CM

## 2024-04-02 DIAGNOSIS — O48.0 POST-TERM PREGNANCY: ICD-10-CM

## 2024-04-02 DIAGNOSIS — Z3A.41 41 WEEKS GESTATION OF PREGNANCY: ICD-10-CM

## 2024-04-02 DIAGNOSIS — O34.13 MATERNAL CARE FOR BENIGN TUMOR OF CORPUS UTERI, THIRD TRIMESTER: ICD-10-CM

## 2024-04-02 DIAGNOSIS — E03.9 HYPOTHYROIDISM, UNSPECIFIED: ICD-10-CM

## 2024-04-02 DIAGNOSIS — D62 ACUTE POSTHEMORRHAGIC ANEMIA: ICD-10-CM

## 2024-04-02 DIAGNOSIS — O34.211 MATERNAL CARE FOR LOW TRANSVERSE SCAR FROM PREVIOUS CESAREAN DELIVERY: ICD-10-CM
